# Patient Record
Sex: FEMALE | Race: WHITE | NOT HISPANIC OR LATINO | Employment: UNEMPLOYED | URBAN - METROPOLITAN AREA
[De-identification: names, ages, dates, MRNs, and addresses within clinical notes are randomized per-mention and may not be internally consistent; named-entity substitution may affect disease eponyms.]

---

## 2017-06-08 RX ORDER — LISINOPRIL 10 MG/1
TABLET ORAL
Qty: 30 TABLET | Refills: 5 | Status: SHIPPED | OUTPATIENT
Start: 2017-06-08 | End: 2018-01-10 | Stop reason: SDUPTHER

## 2018-01-10 RX ORDER — LISINOPRIL 10 MG/1
TABLET ORAL
Qty: 30 TABLET | Refills: 5 | Status: SHIPPED | OUTPATIENT
Start: 2018-01-10 | End: 2019-07-23 | Stop reason: SDUPTHER

## 2018-04-02 ENCOUNTER — OFFICE VISIT (OUTPATIENT)
Dept: FAMILY MEDICINE | Facility: CLINIC | Age: 48
End: 2018-04-02
Payer: COMMERCIAL

## 2018-04-02 VITALS
BODY MASS INDEX: 25.83 KG/M2 | OXYGEN SATURATION: 98 % | HEART RATE: 80 BPM | HEIGHT: 65 IN | DIASTOLIC BLOOD PRESSURE: 70 MMHG | SYSTOLIC BLOOD PRESSURE: 110 MMHG | TEMPERATURE: 98 F | WEIGHT: 155 LBS

## 2018-04-02 DIAGNOSIS — J06.9 ACUTE URI: Primary | ICD-10-CM

## 2018-04-02 PROBLEM — Z78.9 NON-SMOKER: Status: ACTIVE | Noted: 2018-04-02

## 2018-04-02 PROBLEM — E78.00 ELEVATED LOW-DENSITY LIPOPROTEIN LEVEL: Status: ACTIVE | Noted: 2018-04-02

## 2018-04-02 PROBLEM — I10 ESSENTIAL HYPERTENSION: Status: ACTIVE | Noted: 2018-04-02

## 2018-04-02 PROCEDURE — 99213 OFFICE O/P EST LOW 20 MIN: CPT | Mod: ,,, | Performed by: NURSE PRACTITIONER

## 2018-04-02 RX ORDER — PROGESTERONE 200 MG/1
200 CAPSULE ORAL NIGHTLY
Refills: 1 | COMMUNITY
Start: 2018-02-08

## 2018-04-02 RX ORDER — AZITHROMYCIN 250 MG/1
TABLET, FILM COATED ORAL
Qty: 6 TABLET | Refills: 0 | Status: SHIPPED | OUTPATIENT
Start: 2018-04-02 | End: 2018-04-07

## 2018-04-02 RX ORDER — ESTRADIOL 1 MG/1
1 TABLET ORAL DAILY
Refills: 1 | COMMUNITY
Start: 2018-03-13 | End: 2019-07-23

## 2018-04-02 NOTE — PATIENT INSTRUCTIONS

## 2018-04-02 NOTE — PROGRESS NOTES
SUBJECTIVE:      Patient ID: Cely Russell is a 47 y.o. female.    Chief Complaint: Sinusitis (sore throat,no feverx 3 days)    Cely is here today for eval of sore throat and sinus congestion that has been present 3-4 days.      Sinusitis   This is a new problem. The current episode started in the past 7 days. The problem has been waxing and waning since onset. There has been no fever. The pain is mild. Associated symptoms include congestion, coughing, ear pain, headaches, a hoarse voice, sinus pressure, sneezing and a sore throat. Pertinent negatives include no chills, diaphoresis, neck pain, shortness of breath or swollen glands. Past treatments include oral decongestants. The treatment provided significant relief.       History reviewed. No pertinent surgical history.  History reviewed. No pertinent family history.   Social History     Social History    Marital status: Single     Spouse name: N/A    Number of children: N/A    Years of education: N/A     Occupational History    house wife      Social History Main Topics    Smoking status: Never Smoker    Smokeless tobacco: Never Used    Alcohol use Yes    Drug use: No    Sexual activity: Yes     Partners: Male     Other Topics Concern    None     Social History Narrative    None     Current Outpatient Prescriptions   Medication Sig Dispense Refill    estradiol (ESTRACE) 1 MG tablet Take 1 tablet by mouth once daily.  1    progesterone (PROMETRIUM) 200 MG capsule Take 200 mg by mouth every evening.  1    azithromycin (Z-LYLY) 250 MG tablet Take 2 tablets by mouth on day 1; Take 1 tablet by mouth on days 2-5 6 tablet 0    lisinopril 10 MG tablet take 1 tablet by mouth once daily 30 tablet 5     No current facility-administered medications for this visit.      Review of patient's allergies indicates:  No Known Allergies   History reviewed. No pertinent past medical history.  History reviewed. No pertinent surgical history.    Review of Systems  "  Constitutional: Negative for activity change, appetite change, chills, diaphoresis and fatigue.   HENT: Positive for congestion, ear pain, hoarse voice, postnasal drip, rhinorrhea, sinus pressure, sneezing and sore throat.    Eyes: Negative for pain and visual disturbance.   Respiratory: Positive for cough. Negative for shortness of breath.    Cardiovascular: Negative for chest pain and palpitations.   Gastrointestinal: Negative for abdominal pain, constipation and diarrhea.   Endocrine: Negative for polydipsia, polyphagia and polyuria.   Genitourinary: Negative for dysuria, frequency and urgency.   Musculoskeletal: Negative for arthralgias, gait problem, myalgias and neck pain.   Skin: Negative for color change, pallor and rash.   Allergic/Immunologic: Negative for immunocompromised state.   Neurological: Positive for headaches. Negative for dizziness, syncope and numbness.   Hematological: Negative for adenopathy.   Psychiatric/Behavioral: Negative for confusion, self-injury and suicidal ideas.      OBJECTIVE:      Vitals:    04/02/18 1137   BP: 110/70   Pulse: 80   Temp: 98.1 °F (36.7 °C)   SpO2: 98%   Weight: 70.3 kg (155 lb)   Height: 5' 5" (1.651 m)     Physical Exam   Constitutional: She is oriented to person, place, and time. She appears well-developed and well-nourished. No distress.   HENT:   Head: Normocephalic and atraumatic.   Right Ear: External ear and ear canal normal. Tympanic membrane is not erythematous. A middle ear effusion is present.   Left Ear: External ear and ear canal normal. Tympanic membrane is not erythematous. A middle ear effusion is present.   Nose: Mucosal edema and rhinorrhea present. Right sinus exhibits no maxillary sinus tenderness and no frontal sinus tenderness. Left sinus exhibits no maxillary sinus tenderness and no frontal sinus tenderness.   Mouth/Throat: Uvula is midline and oropharynx is clear and moist. No oropharyngeal exudate, posterior oropharyngeal edema or " posterior oropharyngeal erythema.   Eyes: Conjunctivae, EOM and lids are normal. Pupils are equal, round, and reactive to light. Right eye exhibits no discharge. Left eye exhibits no discharge. No scleral icterus.   Neck: Normal range of motion. Neck supple. Carotid bruit is not present. No thyromegaly present.   Cardiovascular: Normal rate, regular rhythm, normal heart sounds and intact distal pulses.  Exam reveals no gallop and no friction rub.    No murmur heard.  Pulmonary/Chest: Effort normal and breath sounds normal. No respiratory distress. She has no wheezes. She has no rales.   Abdominal: Soft. Bowel sounds are normal. There is no tenderness.   Musculoskeletal: Normal range of motion.   Lymphadenopathy:     She has no cervical adenopathy.   Neurological: She is alert and oriented to person, place, and time.   Skin: Skin is warm, dry and intact. She is not diaphoretic.   Psychiatric: She has a normal mood and affect. Her behavior is normal. Judgment and thought content normal. She expresses no suicidal plans.      Assessment:       1. Acute URI        Plan:       Acute URI   Symptoms are most likely due to viral infection.  Advised to take OTC medications such as tylenol and motrin for fever, Mucinex DM or similar for cough, antihistamine and/or decongestant for nasal symptoms.  Monitor blood pressure if a decongestant is used.  Get plenty of rest and fluids to maintain hydration. Gargle with warm salt water if sore throat is present.  An antibiotic Rx was provided in case symptoms worsen or due not resolve within 10-14 days.   -     azithromycin (Z-LYLY) 250 MG tablet; Take 2 tablets by mouth on day 1; Take 1 tablet by mouth on days 2-5  Dispense: 6 tablet; Refill: 0      Follow-up if symptoms worsen or fail to improve.      4/2/2018 Rhoda Lee, CORINE, FNP

## 2019-01-11 ENCOUNTER — CLINICAL SUPPORT (OUTPATIENT)
Dept: URGENT CARE | Facility: CLINIC | Age: 49
End: 2019-01-11
Payer: COMMERCIAL

## 2019-01-11 VITALS
RESPIRATION RATE: 16 BRPM | HEIGHT: 65 IN | OXYGEN SATURATION: 98 % | SYSTOLIC BLOOD PRESSURE: 161 MMHG | WEIGHT: 155 LBS | DIASTOLIC BLOOD PRESSURE: 93 MMHG | HEART RATE: 71 BPM | BODY MASS INDEX: 25.83 KG/M2 | TEMPERATURE: 98 F

## 2019-01-11 DIAGNOSIS — J02.9 SORE THROAT: Primary | ICD-10-CM

## 2019-01-11 DIAGNOSIS — J02.9 VIRAL PHARYNGITIS: ICD-10-CM

## 2019-01-11 LAB
CTP QC/QA: YES
S PYO RRNA THROAT QL PROBE: NEGATIVE

## 2019-01-11 PROCEDURE — 87880 POCT RAPID STREP A: ICD-10-PCS | Mod: QW,S$GLB,, | Performed by: NURSE PRACTITIONER

## 2019-01-11 PROCEDURE — 87880 STREP A ASSAY W/OPTIC: CPT | Mod: QW,S$GLB,, | Performed by: NURSE PRACTITIONER

## 2019-01-11 PROCEDURE — 99204 OFFICE O/P NEW MOD 45 MIN: CPT | Mod: 25,S$GLB,, | Performed by: NURSE PRACTITIONER

## 2019-01-11 PROCEDURE — 99204 PR OFFICE/OUTPT VISIT, NEW, LEVL IV, 45-59 MIN: ICD-10-PCS | Mod: 25,S$GLB,, | Performed by: NURSE PRACTITIONER

## 2019-01-11 NOTE — PATIENT INSTRUCTIONS
Self-Care for Sore Throats    Sore throats happen for many reasons, such as colds, allergies, and infections caused by viruses or bacteria. In any case, your throat becomes red and sore. Your goal for self-care is to reduce your discomfort while giving your throat a chance to heal.  Moisten and soothe your throat  Tips include the following:  · Try a sip of water first thing after waking up.  · Keep your throat moist by drinking 6 or more glasses of clear liquids every day.  · Run a cool-air humidifier in your room overnight.  · Avoid cigarette smoke.   · Suck on throat lozenges, cough drops, hard candy, ice chips, or frozen fruit-juice bars. Use the sugar-free versions if your diet or medical condition requires them.  Gargle to ease irritation  Gargling every hour or 2 can ease irritation. Try gargling with 1 of these solutions:  · 1/4 teaspoon of salt in 1/2 cup of warm water  · An over-the-counter anesthetic gargle  Use medicine for more relief  Over-the-counter medicine can reduce sore throat symptoms. Ask your pharmacist if you have questions about which medicine to use:  · Ease pain with anesthetic sprays. Aspirin or an aspirin substitute also helps. Remember, never give aspirin to anyone 18 or younger, or if you are already taking blood thinners.   · For sore throats caused by allergies, try antihistamines to block the allergic reaction.  · Remember: unless a sore throat is caused by a bacterial infection, antibiotics wont help you.  Prevent future sore throats  Prevention tips include the following:  · Stop smoking or reduce contact with secondhand smoke. Smoke irritates the tender throat lining.  · Limit contact with pets and with allergy-causing substances, such as pollen and mold.  · When youre around someone with a sore throat or cold, wash your hands often to keep viruses or bacteria from spreading.  · Dont strain your vocal cords.  Call your healthcare provider  Contact your healthcare provider if  you have:  · A temperature over 101°F (38.3°C)  · White spots on the throat  · Great difficulty swallowing  · Trouble breathing  · A skin rash  · Recent exposure to someone else with strep bacteria  · Severe hoarseness and swollen glands in the neck or jaw   Date Last Reviewed: 8/1/2016  © 2219-0652 [employer]. [address] [place] [address]. All rights reserved. This information is not intended as a substitute for professional medical care. Always follow your healthcare professional's instructions.        Viral Pharyngitis (Sore Throat)    You (or your child, if your child is the patient) have pharyngitis (sore throat). This infection is caused by a virus. It can cause throat pain that is worse when swallowing, aching all over, headache, and fever. The infection may be spread by coughing, kissing, or touching others after touching your mouth or nose. Antibiotic medications do not work against viruses, so they are not used for treating this condition.  Home care  · If your symptoms are severe, rest at home. Return to work or school when you feel well enough.   · Drink plenty of fluids to avoid dehydration.  · For children: Use acetaminophen for fever, fussiness or discomfort. In infants over six months of age, you may use ibuprofen instead of acetaminophen. (NOTE: If your child has chronic liver or kidney disease or ever had a stomach ulcer or GI bleeding, talk with your doctor before using these medicines.) (NOTE: Aspirin should never be used in anyone under 18 years of age who is ill with a fever. It may cause severe liver damage.)   · For adults: You may use acetaminophen or ibuprofen to control pain or fever, unless another medicine was prescribed for this. (NOTE: If you have chronic liver or kidney disease or ever had a stomach ulcer or GI bleeding, talk with your doctor before using these medicines.)  · Throat lozenges or numbing throat sprays can help reduce pain. Gargling with warm  salt water will also help reduce throat pain. For this, dissolve 1/2 teaspoon of salt in 1 glass of warm water. To help soothe a sore throat, children can sip on juice or a popsicle. Children 5 years and older can also suck on a lollipop or hard candy.  · Avoid salty or spicy foods, which can be irritating to the throat.  Follow-up care  Follow up with your healthcare provider or our staff if you are not improving over the next week.  When to seek medical advice  Call your healthcare provider right away if any of these occur:  · Fever as directed by your doctor.  For children, seek care if:  ¨ Your child is of any age and has repeated fevers above 104°F (40°C).  ¨ Your child is younger than 2 years of age and has a fever of 100.4°F (38°C) that continues for more than 1 day.  ¨ Your child is 2 years old or older and has a fever of 100.4°F (38°C) that continues for more than 3 days.  · New or worsening ear pain, sinus pain, or headache  · Painful lumps in the back of neck  · Stiff neck  · Lymph nodes are getting larger  · Inability to swallow liquids, excessive drooling, or inability to open mouth wide due to throat pain  · Signs of dehydration (very dark urine or no urine, sunken eyes, dizziness)  · Trouble breathing or noisy breathing  · Muffled voice  · New rash  · Child appears to be getting sicker  Date Last Reviewed: 4/13/2015  © 1720-2209 [employer]. [address] [place] [address], [place] [address]. All rights reserved. This information is not intended as a substitute for professional medical care. Always follow your healthcare professional's instructions.

## 2019-01-11 NOTE — PROGRESS NOTES
"Subjective:       Patient ID: Cely Russell is a 48 y.o. female.    Vitals:  height is 5' 5" (1.651 m) and weight is 70.3 kg (155 lb). Her oral temperature is 98 °F (36.7 °C). Her blood pressure is 161/93 (abnormal) and her pulse is 71. Her respiration is 16 and oxygen saturation is 98%.     Chief Complaint: Sore Throat    Cely Russell is a 48 year old female presenting to the clinic with c/o a one day history of sore throat. She states that she believes she had fever yesterday but did not take her temperature. She denies any other upper respiratory symptoms. She has taken no medications for her symptoms. No known sick contacts.       Sore Throat    This is a new problem. The current episode started yesterday. Pertinent negatives include no ear pain or trouble swallowing. Treatments tried: hallie-seltzer cold.       Constitution: Positive for chills and fever. Negative for appetite change.   HENT: Positive for sore throat. Negative for ear pain, sinus pain, sinus pressure and trouble swallowing.    Neck: negative.   Cardiovascular: Negative.    Eyes: Negative for eye discharge and eye redness.   Respiratory: Negative.    Gastrointestinal: Negative.    Musculoskeletal: Negative.    Skin: Negative.        Objective:      Physical Exam   Constitutional: She is oriented to person, place, and time. She appears well-developed and well-nourished. She is cooperative.  Non-toxic appearance. She does not appear ill. No distress.   HENT:   Head: Normocephalic and atraumatic.   Right Ear: Hearing, tympanic membrane, external ear and ear canal normal.   Left Ear: Hearing, tympanic membrane, external ear and ear canal normal.   Nose: Nose normal. No mucosal edema, rhinorrhea or nasal deformity. No epistaxis. Right sinus exhibits no maxillary sinus tenderness and no frontal sinus tenderness. Left sinus exhibits no maxillary sinus tenderness and no frontal sinus tenderness.   Mouth/Throat: Uvula is midline and mucous membranes are " normal. No trismus in the jaw. Normal dentition. No uvula swelling. Posterior oropharyngeal erythema present. No oropharyngeal exudate or posterior oropharyngeal edema.   Eyes: Conjunctivae and lids are normal. Right eye exhibits no discharge. Left eye exhibits no discharge. No scleral icterus.   Sclera clear bilat   Neck: Trachea normal, normal range of motion, full passive range of motion without pain and phonation normal. Neck supple.   Cardiovascular: Normal rate, regular rhythm, normal heart sounds, intact distal pulses and normal pulses.   Pulmonary/Chest: Effort normal and breath sounds normal. No respiratory distress.   Abdominal: Soft. Normal appearance and bowel sounds are normal. She exhibits no distension, no pulsatile midline mass and no mass. There is no tenderness.   Musculoskeletal: Normal range of motion. She exhibits no edema or deformity.   Lymphadenopathy:     She has cervical adenopathy.   Neurological: She is alert and oriented to person, place, and time. She exhibits normal muscle tone. Coordination normal.   Skin: Skin is warm, dry and intact. She is not diaphoretic. No pallor.   Psychiatric: She has a normal mood and affect. Her speech is normal and behavior is normal. Judgment and thought content normal. Cognition and memory are normal.   Nursing note and vitals reviewed.      Assessment:       1. Sore throat    2. Viral pharyngitis        Plan:       The patient appears to have a viral upper respiratory infection with a viral syndrome.  Based upon the history and physical exam the patient does not appear to have a serious bacterial infection such as pneumonia, sepsis, otitis media, bacterial sinusitis, strep pharyngitis, parapharyngeal or peritonsillar abscess, meningitis.  I do not think the patient needs antibiotics as their illness likely has a viral etiology.  Patient appears very well and I have given specific return precautions to the patient and/or family members.  I have instructed  the patient to hydrate, take over the counter medications and follow up with their regular doctor or the one provided. Based on my clinical evaluation, I do not appreciate any immediate, emergent, or life threatening condition or etiology that warrants additional workup today and feel that the patient can be discharged with close follow up care.         Sore throat  -     POCT rapid strep A    Viral pharyngitis

## 2019-07-23 ENCOUNTER — OFFICE VISIT (OUTPATIENT)
Dept: FAMILY MEDICINE | Facility: CLINIC | Age: 49
End: 2019-07-23
Payer: COMMERCIAL

## 2019-07-23 VITALS
SYSTOLIC BLOOD PRESSURE: 160 MMHG | OXYGEN SATURATION: 98 % | WEIGHT: 161 LBS | DIASTOLIC BLOOD PRESSURE: 94 MMHG | HEART RATE: 91 BPM | TEMPERATURE: 99 F | BODY MASS INDEX: 26.82 KG/M2 | HEIGHT: 65 IN

## 2019-07-23 DIAGNOSIS — I10 ESSENTIAL HYPERTENSION: Primary | ICD-10-CM

## 2019-07-23 DIAGNOSIS — N95.1 PERIMENOPAUSE: ICD-10-CM

## 2019-07-23 PROCEDURE — 99213 OFFICE O/P EST LOW 20 MIN: CPT | Mod: ,,, | Performed by: INTERNAL MEDICINE

## 2019-07-23 PROCEDURE — 99213 PR OFFICE/OUTPT VISIT, EST, LEVL III, 20-29 MIN: ICD-10-PCS | Mod: ,,, | Performed by: INTERNAL MEDICINE

## 2019-07-23 RX ORDER — LISINOPRIL 10 MG/1
10 TABLET ORAL DAILY
Qty: 90 TABLET | Refills: 1 | Status: SHIPPED | OUTPATIENT
Start: 2019-07-23 | End: 2020-01-30

## 2019-07-23 NOTE — PROGRESS NOTES
Subjective:       Patient ID: Cely Russell is a 49 y.o. female.    Chief Complaint: Hypertension (continuity of care and med refill)    Hypertension   This is a chronic problem. The problem is uncontrolled. Pertinent negatives include no chest pain, headaches, neck pain, palpitations, peripheral edema or shortness of breath. Past treatments include ACE inhibitors. Compliance problems: lack of follow up;  has been out of meds for months.      Review of Systems   Constitutional: Positive for unexpected weight change (weight gain). Negative for chills, fatigue and fever.   HENT: Negative for congestion, hearing loss, postnasal drip, rhinorrhea, trouble swallowing and voice change.    Eyes: Negative for photophobia and visual disturbance.   Respiratory: Negative for apnea, cough, choking, chest tightness, shortness of breath and wheezing.    Cardiovascular: Negative for chest pain, palpitations and leg swelling.   Gastrointestinal: Negative for abdominal pain, blood in stool, constipation, diarrhea, nausea, rectal pain and vomiting.   Endocrine: Negative for cold intolerance, heat intolerance, polydipsia and polyuria.   Genitourinary: Negative for decreased urine volume, difficulty urinating, dysuria, frequency, genital sores, hematuria, menstrual problem, pelvic pain, urgency, vaginal bleeding and vaginal discharge.   Musculoskeletal: Negative for arthralgias, back pain, gait problem, joint swelling, myalgias, neck pain and neck stiffness.   Skin: Negative for color change, rash and wound.   Allergic/Immunologic: Negative for environmental allergies and food allergies.   Neurological: Negative for dizziness, tremors, seizures, syncope, facial asymmetry, speech difficulty, weakness, light-headedness, numbness and headaches.   Hematological: Negative for adenopathy. Does not bruise/bleed easily.   Psychiatric/Behavioral: Negative for confusion, hallucinations, sleep disturbance and suicidal ideas. The patient  is not nervous/anxious.        Past Medical History:   Diagnosis Date    Hypertension       Past Surgical History:   Procedure Laterality Date    BREAST SURGERY      augmentation    TUBAL LIGATION         Family History   Problem Relation Age of Onset    Diabetes Mother     Coronary artery disease Father         early 60s    Hypertension Father     Breast cancer Neg Hx     Colon cancer Neg Hx        Social History     Socioeconomic History    Marital status: Single     Spouse name: Not on file    Number of children: Not on file    Years of education: Not on file    Highest education level: Not on file   Occupational History    Occupation: house wife   Social Needs    Financial resource strain: Not on file    Food insecurity:     Worry: Not on file     Inability: Not on file    Transportation needs:     Medical: Not on file     Non-medical: Not on file   Tobacco Use    Smoking status: Never Smoker    Smokeless tobacco: Never Used   Substance and Sexual Activity    Alcohol use: Yes     Frequency: Monthly or less     Drinks per session: 3 or 4    Drug use: No    Sexual activity: Yes     Partners: Male     Birth control/protection: See Surgical Hx   Lifestyle    Physical activity:     Days per week: Not on file     Minutes per session: Not on file    Stress: Not at all   Relationships    Social connections:     Talks on phone: Not on file     Gets together: Not on file     Attends Muslim service: Not on file     Active member of club or organization: Not on file     Attends meetings of clubs or organizations: Not on file     Relationship status: Not on file   Other Topics Concern    Not on file   Social History Narrative    Live with        Current Outpatient Medications   Medication Sig Dispense Refill    progesterone (PROMETRIUM) 200 MG capsule Take 200 mg by mouth every evening.  1    lisinopril 10 MG tablet Take 1 tablet (10 mg total) by mouth once daily. 90 tablet 1     No  "current facility-administered medications for this visit.        Review of patient's allergies indicates:  No Known Allergies  Objective:    HPI     Hypertension      Additional comments: continuity of care and med refill          Last edited by Alireza Mckeon MA on 7/23/2019  1:53 PM. (History)      Blood pressure (!) 160/94, pulse 91, temperature 99.1 °F (37.3 °C), temperature source Temporal, height 5' 5" (1.651 m), weight 73 kg (161 lb), last menstrual period 07/14/2019, SpO2 98 %. Body mass index is 26.79 kg/m².   Physical Exam   Constitutional: She appears well-developed. No distress.   HENT:   Nose: Nose normal.   Mouth/Throat: Oropharynx is clear and moist.   Eyes: Conjunctivae are normal. Right eye exhibits no discharge. Left eye exhibits no discharge. No scleral icterus.   Neck: Carotid bruit is not present.   Cardiovascular: Normal rate, regular rhythm and normal heart sounds.   No murmur heard.  Pulmonary/Chest: Effort normal and breath sounds normal. No respiratory distress. She has no decreased breath sounds. She has no wheezes. She has no rhonchi. She has no rales.   Abdominal: Soft. She exhibits no distension. There is no tenderness. There is no rebound and no guarding.   Musculoskeletal: She exhibits no edema.   Neurological: She is alert. She displays no tremor.   Skin: Skin is warm and dry.   Psychiatric: She has a normal mood and affect. Her speech is normal.   Nursing note and vitals reviewed.          Assessment:       1. Essential hypertension    2. Perimenopause        Plan:       Cely was seen today for hypertension.    Diagnoses and all orders for this visit:    Essential hypertension  -     lisinopril 10 MG tablet; Take 1 tablet (10 mg total) by mouth once daily.    Perimenopause  Comments:  She recently started Bio T with her GYN.  Encouraged to monitor labs and not have testosterone be supra physiologic         "

## 2020-01-29 DIAGNOSIS — I10 ESSENTIAL HYPERTENSION: ICD-10-CM

## 2020-01-30 RX ORDER — LISINOPRIL 10 MG/1
TABLET ORAL
Qty: 90 TABLET | Refills: 0 | Status: SHIPPED | OUTPATIENT
Start: 2020-01-30 | End: 2020-03-30 | Stop reason: SDUPTHER

## 2020-03-30 ENCOUNTER — OFFICE VISIT (OUTPATIENT)
Dept: FAMILY MEDICINE | Facility: CLINIC | Age: 50
End: 2020-03-30
Payer: COMMERCIAL

## 2020-03-30 VITALS
BODY MASS INDEX: 24.16 KG/M2 | HEIGHT: 65 IN | DIASTOLIC BLOOD PRESSURE: 66 MMHG | SYSTOLIC BLOOD PRESSURE: 110 MMHG | HEART RATE: 84 BPM | TEMPERATURE: 99 F | WEIGHT: 145 LBS | OXYGEN SATURATION: 98 %

## 2020-03-30 DIAGNOSIS — I10 ESSENTIAL HYPERTENSION: ICD-10-CM

## 2020-03-30 PROCEDURE — 99213 PR OFFICE/OUTPT VISIT, EST, LEVL III, 20-29 MIN: ICD-10-PCS | Mod: S$GLB,,, | Performed by: INTERNAL MEDICINE

## 2020-03-30 PROCEDURE — 99213 OFFICE O/P EST LOW 20 MIN: CPT | Mod: S$GLB,,, | Performed by: INTERNAL MEDICINE

## 2020-03-30 RX ORDER — LISINOPRIL 10 MG/1
TABLET ORAL
Qty: 90 TABLET | Refills: 1 | Status: SHIPPED | OUTPATIENT
Start: 2020-03-30

## 2020-03-30 NOTE — PROGRESS NOTES
Subjective:       Patient ID: Cely Russell is a 49 y.o. female.    Chief Complaint: Hypertension (continuity of care)    Here for routine f/u and med refills.  Has had labs within the last 3 months with GYN to monitor Bio T pellets.      Hypertension   This is a chronic problem. The problem is controlled. Associated symptoms include anxiety. Pertinent negatives include no chest pain, headaches, neck pain, palpitations, peripheral edema or shortness of breath. Risk factors for coronary artery disease include post-menopausal state. Past treatments include ACE inhibitors. There are no compliance problems.      Review of Systems   Constitutional: Negative for chills, fatigue, fever and unexpected weight change.   HENT: Negative for congestion, hearing loss, postnasal drip, rhinorrhea, trouble swallowing and voice change.    Eyes: Negative for photophobia and visual disturbance.   Respiratory: Negative for apnea, cough, choking, chest tightness, shortness of breath and wheezing.    Cardiovascular: Negative for chest pain, palpitations and leg swelling.   Gastrointestinal: Negative for abdominal pain, blood in stool, constipation, diarrhea, nausea, rectal pain and vomiting.   Endocrine: Negative for cold intolerance, heat intolerance, polydipsia and polyuria.   Genitourinary: Negative for decreased urine volume, difficulty urinating, dysuria, frequency, genital sores, hematuria, menstrual problem, pelvic pain, urgency, vaginal bleeding and vaginal discharge.   Musculoskeletal: Negative for arthralgias, back pain, gait problem, joint swelling, myalgias, neck pain and neck stiffness.   Skin: Negative for color change, rash and wound.   Allergic/Immunologic: Negative for environmental allergies and food allergies.   Neurological: Negative for dizziness, tremors, seizures, syncope, facial asymmetry, speech difficulty, weakness, light-headedness, numbness and headaches.   Hematological: Negative for adenopathy. Does  not bruise/bleed easily.   Psychiatric/Behavioral: Negative for confusion, hallucinations, sleep disturbance and suicidal ideas. The patient is not nervous/anxious.        Past Medical History:   Diagnosis Date    Hypertension       Past Surgical History:   Procedure Laterality Date    BREAST SURGERY      augmentation    TUBAL LIGATION         Family History   Problem Relation Age of Onset    Diabetes Mother     Coronary artery disease Father         early 60s    Hypertension Father     Breast cancer Neg Hx     Colon cancer Neg Hx        Social History     Socioeconomic History    Marital status:      Spouse name: Not on file    Number of children: Not on file    Years of education: Not on file    Highest education level: Not on file   Occupational History    Occupation: house wife   Social Needs    Financial resource strain: Not on file    Food insecurity:     Worry: Not on file     Inability: Not on file    Transportation needs:     Medical: Not on file     Non-medical: Not on file   Tobacco Use    Smoking status: Never Smoker    Smokeless tobacco: Never Used   Substance and Sexual Activity    Alcohol use: Yes     Frequency: Monthly or less     Drinks per session: 3 or 4    Drug use: No    Sexual activity: Yes     Partners: Male     Birth control/protection: See Surgical Hx   Lifestyle    Physical activity:     Days per week: Not on file     Minutes per session: Not on file    Stress: Not at all   Relationships    Social connections:     Talks on phone: Not on file     Gets together: Not on file     Attends Judaism service: Not on file     Active member of club or organization: Not on file     Attends meetings of clubs or organizations: Not on file     Relationship status: Not on file   Other Topics Concern    Not on file   Social History Narrative    Live with        Current Outpatient Medications   Medication Sig Dispense Refill    lisinopriL 10 MG tablet TAKE 1  "TABLET(10 MG) BY MOUTH EVERY DAY 90 tablet 1    progesterone (PROMETRIUM) 200 MG capsule Take 200 mg by mouth every evening.  1     No current facility-administered medications for this visit.        Review of patient's allergies indicates:  No Known Allergies  Objective:    HPI     Hypertension      Additional comments: continuity of care          Last edited by Alireza Mckeon MA on 3/30/2020 10:40 AM. (History)      Blood pressure 110/66, pulse 84, temperature 98.7 °F (37.1 °C), temperature source Oral, height 5' 5" (1.651 m), weight 65.8 kg (145 lb), last menstrual period 02/17/2020, SpO2 98 %. Body mass index is 24.13 kg/m².   Physical Exam   Constitutional: She appears well-developed. No distress.   HENT:   Nose: Nose normal.   Mouth/Throat: Oropharynx is clear and moist.   Eyes: Conjunctivae are normal. Right eye exhibits no discharge. Left eye exhibits no discharge. No scleral icterus.   Neck: Carotid bruit is not present.   Cardiovascular: Normal rate, regular rhythm and normal heart sounds.   No murmur heard.  Pulmonary/Chest: Effort normal and breath sounds normal. No respiratory distress. She has no decreased breath sounds. She has no wheezes. She has no rhonchi. She has no rales.   Abdominal: Soft. She exhibits no distension. There is no tenderness. There is no rebound and no guarding.   Musculoskeletal: She exhibits no edema.   Neurological: She is alert. She displays no tremor.   Skin: Skin is warm and dry.   Psychiatric: She has a normal mood and affect. Her speech is normal.   Nursing note and vitals reviewed.          Assessment:       1. Essential hypertension        Plan:       Cely was seen today for hypertension.    Diagnoses and all orders for this visit:    Essential hypertension  Comments:  Low salt diet    Orders:  -     lisinopriL 10 MG tablet; TAKE 1 TABLET(10 MG) BY MOUTH EVERY DAY         "

## 2020-04-28 ENCOUNTER — CLINICAL SUPPORT (OUTPATIENT)
Dept: URGENT CARE | Facility: CLINIC | Age: 50
End: 2020-04-28
Payer: COMMERCIAL

## 2020-04-28 VITALS
DIASTOLIC BLOOD PRESSURE: 77 MMHG | BODY MASS INDEX: 24.39 KG/M2 | SYSTOLIC BLOOD PRESSURE: 130 MMHG | TEMPERATURE: 97 F | HEART RATE: 69 BPM | HEIGHT: 65 IN | OXYGEN SATURATION: 98 % | WEIGHT: 146.38 LBS

## 2020-04-28 DIAGNOSIS — R42 VERTIGO: Primary | ICD-10-CM

## 2020-04-28 DIAGNOSIS — R11.2 NAUSEA AND VOMITING, INTRACTABILITY OF VOMITING NOT SPECIFIED, UNSPECIFIED VOMITING TYPE: ICD-10-CM

## 2020-04-28 LAB
B-HCG UR QL: NEGATIVE
BILIRUB UR QL STRIP: NEGATIVE
CTP QC/QA: YES
GLUCOSE UR QL STRIP: NEGATIVE
KETONES UR QL STRIP: POSITIVE
LEUKOCYTE ESTERASE UR QL STRIP: NEGATIVE
PH, POC UA: 5
POC BLOOD, URINE: POSITIVE
POC NITRATES, URINE: NEGATIVE
PROT UR QL STRIP: POSITIVE
SP GR UR STRIP: 1.02 (ref 1–1.03)
UROBILINOGEN UR STRIP-ACNC: NORMAL (ref 0.1–1.1)

## 2020-04-28 PROCEDURE — 99214 OFFICE O/P EST MOD 30 MIN: CPT | Mod: 25,S$GLB,, | Performed by: NURSE PRACTITIONER

## 2020-04-28 PROCEDURE — 81003 POCT URINALYSIS, DIPSTICK, AUTOMATED, W/O SCOPE: ICD-10-PCS | Mod: QW,S$GLB,, | Performed by: NURSE PRACTITIONER

## 2020-04-28 PROCEDURE — 81003 URINALYSIS AUTO W/O SCOPE: CPT | Mod: QW,S$GLB,, | Performed by: NURSE PRACTITIONER

## 2020-04-28 PROCEDURE — 81025 URINE PREGNANCY TEST: CPT | Mod: S$GLB,,, | Performed by: NURSE PRACTITIONER

## 2020-04-28 PROCEDURE — 81025 POCT URINE PREGNANCY: ICD-10-PCS | Mod: S$GLB,,, | Performed by: NURSE PRACTITIONER

## 2020-04-28 PROCEDURE — 99214 PR OFFICE/OUTPT VISIT, EST, LEVL IV, 30-39 MIN: ICD-10-PCS | Mod: 25,S$GLB,, | Performed by: NURSE PRACTITIONER

## 2020-04-28 RX ORDER — ONDANSETRON 4 MG/1
4 TABLET, ORALLY DISINTEGRATING ORAL
Status: COMPLETED | OUTPATIENT
Start: 2020-04-28 | End: 2020-04-28

## 2020-04-28 RX ORDER — CETIRIZINE HYDROCHLORIDE 10 MG/1
10 TABLET ORAL DAILY
Qty: 30 TABLET | Refills: 0 | Status: SHIPPED | OUTPATIENT
Start: 2020-04-28 | End: 2020-05-28

## 2020-04-28 RX ORDER — FLUTICASONE PROPIONATE 50 MCG
2 SPRAY, SUSPENSION (ML) NASAL DAILY
Qty: 15.8 ML | Refills: 0 | Status: SHIPPED | OUTPATIENT
Start: 2020-04-28

## 2020-04-28 RX ORDER — ONDANSETRON 4 MG/1
4 TABLET, ORALLY DISINTEGRATING ORAL EVERY 8 HOURS PRN
Qty: 9 TABLET | Refills: 0 | Status: SHIPPED | OUTPATIENT
Start: 2020-04-28 | End: 2020-05-01

## 2020-04-28 RX ORDER — MECLIZINE HYDROCHLORIDE 25 MG/1
25 TABLET ORAL 3 TIMES DAILY PRN
Qty: 30 TABLET | Refills: 0 | Status: SHIPPED | OUTPATIENT
Start: 2020-04-28 | End: 2020-05-08

## 2020-04-28 RX ADMIN — ONDANSETRON 4 MG: 4 TABLET, ORALLY DISINTEGRATING ORAL at 12:04

## 2020-04-28 NOTE — PROGRESS NOTES
"Subjective:       Patient ID: Cely Russell is a 50 y.o. female.    Vitals:  height is 5' 5" (1.651 m) and weight is 66.4 kg (146 lb 6.4 oz). Her oral temperature is 97.2 °F (36.2 °C). Her blood pressure is 130/77 and her pulse is 69. Her oxygen saturation is 98%.     Chief Complaint: Nausea    Patient complains of feeling "woozy" and having nausea and vomiting for a few days. Denies fever or abdominal pain, diarrhea, shortness of breath or chest pain.     Nausea   This is a new problem. The current episode started in the past 7 days. The problem occurs constantly. The problem has been gradually worsening. Associated symptoms include fatigue, nausea and vomiting. Pertinent negatives include no arthralgias, chest pain, chills, congestion, coughing, fever, headaches, joint swelling, myalgias, rash, sore throat, vertigo or weakness. Associated symptoms comments: Dizziness  .       Constitution: Positive for fatigue. Negative for chills and fever.   HENT: Negative for congestion and sore throat.    Neck: Negative for painful lymph nodes.   Cardiovascular: Negative for chest pain and leg swelling.   Eyes: Negative for double vision and blurred vision.   Respiratory: Negative for cough and shortness of breath.    Gastrointestinal: Positive for nausea and vomiting. Negative for diarrhea.   Genitourinary: Negative for dysuria, frequency, urgency and history of kidney stones.   Musculoskeletal: Negative for joint pain, joint swelling, muscle cramps and muscle ache.   Skin: Negative for color change, pale, rash and bruising.   Allergic/Immunologic: Negative for seasonal allergies.   Neurological: Positive for dizziness. Negative for history of vertigo, light-headedness, passing out and headaches.   Hematologic/Lymphatic: Negative for swollen lymph nodes.   Psychiatric/Behavioral: Negative for nervous/anxious, sleep disturbance and depression. The patient is not nervous/anxious.        Objective:      Physical Exam "   Constitutional: She is oriented to person, place, and time. Vital signs are normal. She appears well-developed and well-nourished. She is cooperative.  Non-toxic appearance. She does not have a sickly appearance. She does not appear ill. No distress.   HENT:   Head: Normocephalic and atraumatic.   Right Ear: Hearing, external ear and ear canal normal. A middle ear effusion is present.   Left Ear: Hearing, external ear and ear canal normal. A middle ear effusion is present.   Nose: Nose normal. No mucosal edema, rhinorrhea or nasal deformity. No epistaxis. Right sinus exhibits no maxillary sinus tenderness and no frontal sinus tenderness. Left sinus exhibits no maxillary sinus tenderness and no frontal sinus tenderness.   Mouth/Throat: Uvula is midline, oropharynx is clear and moist and mucous membranes are normal. No trismus in the jaw. Normal dentition. No uvula swelling. No posterior oropharyngeal erythema.   Eyes: Conjunctivae and lids are normal. Right eye exhibits no discharge. Left eye exhibits no discharge. No scleral icterus.   Neck: Trachea normal, normal range of motion, full passive range of motion without pain and phonation normal. Neck supple.   Cardiovascular: Normal rate, regular rhythm, normal heart sounds, intact distal pulses and normal pulses.   Pulmonary/Chest: Effort normal and breath sounds normal. No respiratory distress.   Abdominal: Soft. Normal appearance and bowel sounds are normal. She exhibits no distension, no pulsatile midline mass and no mass. There is no tenderness.   Musculoskeletal: Normal range of motion. She exhibits no edema or deformity.   Neurological: She is alert and oriented to person, place, and time. She exhibits normal muscle tone. Coordination normal. GCS eye subscore is 4. GCS verbal subscore is 5. GCS motor subscore is 6.   +[place]   Skin: Skin is warm, dry, intact, not diaphoretic and not pale.   Psychiatric: She has a normal mood and affect. Her speech is  normal and behavior is normal. Judgment and thought content normal. Cognition and memory are normal.   Nursing note and vitals reviewed.        Assessment:       1. Vertigo    2. Nausea and vomiting, intractability of vomiting not specified, unspecified vomiting type        Plan:       Ua negative. UPT negative    After complete evaluation, including thorough history and physical exam, the patient's symptoms are most likely due to peripheral cause of vertigo (including, but not limited to BPPV, post-viral labyrinthitis, or Meniere's disease). There are no accompanying signs of focal weakness, sensory deficits, or cerebellar symptoms consistent with acute CVA, and no further neuroimaging is indicated or likely to be of benefit at this time. There is no associated headache to suggest SAH. There is no meningismus, fever, or evidence of infection to suggest meningitis/encephalitis. The patient was treated with supportive care zofran and improved. Peggy does not have Meclizine in clinic.    Vertigo    Nausea and vomiting, intractability of vomiting not specified, unspecified vomiting type  -     POCT Urinalysis, Dipstick, Automated, W/O Scope  -     POCT urine pregnancy    Other orders  -     ondansetron (ZOFRAN-ODT) 4 MG TbDL; Take 1 tablet (4 mg total) by mouth every 8 (eight) hours as needed.  Dispense: 9 tablet; Refill: 0  -     ondansetron disintegrating tablet 4 mg  -     cetirizine (ZYRTEC) 10 MG tablet; Take 1 tablet (10 mg total) by mouth once daily.  Dispense: 30 tablet; Refill: 0  -     fluticasone propionate (FLONASE) 50 mcg/actuation nasal spray; 2 sprays (100 mcg total) by Each Nostril route once daily.  Dispense: 15.8 mL; Refill: 0  -     meclizine (ANTIVERT) 25 mg tablet; Take 1 tablet (25 mg total) by mouth 3 (three) times daily as needed.  Dispense: 30 tablet; Refill: 0

## 2020-04-28 NOTE — PATIENT INSTRUCTIONS
Vertigo (Unknown Cause)    In addition to helping with hearing, the inner ear is part of the balance center of your body. Problems with the inner ear can a false feeling of motion. This is called vertigo. Often, it feels as if you or the room is spinning. A vertigo attack may cause sudden nausea, vomiting and heavy sweating. Severe vertigo causes a loss of balance and can cause you to fall. During vertigo, small head movements and changes in body position will often make the symptoms worse. You may also have ringing in the ears called tinnitus.  An episode of vertigo may last seconds, minutes or hours. Once you are over the first episode, it may never come back. However, symptoms may return off and on.  The cause of your vertigo is not yet known. Possible causes of vertigo include:  · Inflammation of the inner ear  · Disease of the nerves to the inner ear  · Movement of calcium particles in the inner ear  · Poor blood flow to the balance centers of the brain  · Migraine headaches  Home care  · If symptoms are severe, rest quietly in bed. Change positions very slowly. There is usually one position that will feel best, such as lying on one side or lying on your back with your head slightly raised on pillows.  · Do not drive a car or work with dangerous machinery until symptoms have been gone for at least one week.  · Take medicine as prescribed to relieve your symptoms. Unless another medicine was prescribed for symptoms of nausea, vomiting, and dizziness, you may use over-the-counter motion sickness pills. Ask your pharmacist for suggestions.  Follow-up care  Follow up with your healthcare provider or as directed. If you are referred to a specialist or for testing, make the appointment promptly.  When to seek medical advice  Call your healthcare provider if any of the following occur:  · Fever of 100.4°F (38ºC) or higher, or as directed by your healthcare provider  · Vertigo worsens or is not controlled  by prescribed medicine   · Repeated vomiting not relieved by prescribed medicine   · Severe headache  · Confusion  · Weakness of an arm or leg or one side of the face  · Difficulty with speech or vision  · Loss of consciousness   · Seizure  Date Last Reviewed: 8/16/2015 © 2000-2017 [employer]. [address] [place] [address]. All rights reserved. This information is not intended as a substitute for professional medical care. Always follow your healthcare professional's instructions.        Inner Ear Problems: Causes of Dizziness (Vertigo)       Benign positional vertigo (BPV)  This is the most common cause of vertigo. BPV is also called benign positional paroxysmal vertigo (BPPV). It happens when crystals in the ear canals shift into the wrong place. Vertigo usually occurs when you move your head in a certain way. This can happen when turning in bed, bending, or looking up. Because BPV comes on quickly, you should think about if you are safe to drive or do other tasks that need your full attention.  BPV:  · Causes vertigo that last for seconds. Vertigo can occur several times a day, depending on body position.  · Doesnt cause hearing loss  · Often goes away on its own. But it but may go away sooner with treatment.  Infection or inflammation  Sometimes the semicircular canals swell and send incorrect balance signals. This problem may be caused by a viral infection. Depending on the cause, your hearing can be affected (labyrinthitis). Or your hearing can remain normal (neuronitis).  Infection or inflammation:  · Causes vertigo that lasts for hours or days. The first episode is usually the worst.  · Can cause hearing loss  · Often goes away on its own. But it may go away sooner with treatment.  You may need vestibular rehabilitation if you have balance problems that don't go away.  Menieres disease  This condition is uncommon. It happens when there is too much fluid in the ear canals. This  causes increased pressure and swelling. It affects balance and hearing signals.  Menieres disease may:  · Cause vertigo that last for hours  · Cause hearing problems that come and go. The problems are usually in one ear and get worse over time.  · Cause buzzing or ringing in the ears (tinnitus)  · Cause a feeling of fullness or pressure in the ear  · Cause any of these symptoms: vertigo, hearing loss, tinnitus, or ear fullness to last a lifetime  Date Last Reviewed: 11/1/2016  © 9408-3788 [employer]. [address] [place] [address]. All rights reserved. This information is not intended as a substitute for professional medical care. Always follow your healthcare professional's instructions.

## 2021-04-01 ENCOUNTER — OFFICE VISIT (OUTPATIENT)
Dept: URGENT CARE | Facility: CLINIC | Age: 51
End: 2021-04-01
Payer: COMMERCIAL

## 2021-04-01 VITALS
BODY MASS INDEX: 26.33 KG/M2 | HEART RATE: 76 BPM | DIASTOLIC BLOOD PRESSURE: 77 MMHG | SYSTOLIC BLOOD PRESSURE: 133 MMHG | WEIGHT: 158 LBS | OXYGEN SATURATION: 98 % | TEMPERATURE: 98 F | HEIGHT: 65 IN

## 2021-04-01 DIAGNOSIS — J02.9 PHARYNGITIS, UNSPECIFIED ETIOLOGY: Primary | ICD-10-CM

## 2021-04-01 PROCEDURE — 99214 OFFICE O/P EST MOD 30 MIN: CPT | Mod: S$GLB,,, | Performed by: EMERGENCY MEDICINE

## 2021-04-01 PROCEDURE — 99214 PR OFFICE/OUTPT VISIT, EST, LEVL IV, 30-39 MIN: ICD-10-PCS | Mod: S$GLB,,, | Performed by: EMERGENCY MEDICINE

## 2021-04-01 RX ORDER — AMOXICILLIN 875 MG/1
875 TABLET, FILM COATED ORAL 2 TIMES DAILY
Qty: 20 TABLET | Refills: 0 | Status: SHIPPED | OUTPATIENT
Start: 2021-04-01 | End: 2021-04-11

## 2021-04-01 RX ORDER — BENZONATATE 100 MG/1
200 CAPSULE ORAL 3 TIMES DAILY PRN
Qty: 30 CAPSULE | Refills: 1 | Status: SHIPPED | OUTPATIENT
Start: 2021-04-01 | End: 2021-05-01

## 2021-10-08 ENCOUNTER — OFFICE VISIT (OUTPATIENT)
Dept: URGENT CARE | Facility: CLINIC | Age: 51
End: 2021-10-08
Payer: COMMERCIAL

## 2021-10-08 VITALS
WEIGHT: 158 LBS | HEART RATE: 69 BPM | BODY MASS INDEX: 26.33 KG/M2 | TEMPERATURE: 98 F | SYSTOLIC BLOOD PRESSURE: 154 MMHG | RESPIRATION RATE: 14 BRPM | HEIGHT: 65 IN | OXYGEN SATURATION: 99 % | DIASTOLIC BLOOD PRESSURE: 81 MMHG

## 2021-10-08 DIAGNOSIS — Z20.822 COVID-19 VIRUS NOT DETECTED: Primary | ICD-10-CM

## 2021-10-08 DIAGNOSIS — Z11.52 ENCOUNTER FOR SCREENING FOR COVID-19: ICD-10-CM

## 2021-10-08 LAB
CTP QC/QA: YES
SARS-COV-2 RDRP RESP QL NAA+PROBE: NEGATIVE

## 2021-10-08 PROCEDURE — U0002: ICD-10-PCS | Mod: QW,S$GLB,, | Performed by: NURSE PRACTITIONER

## 2021-10-08 PROCEDURE — U0002 COVID-19 LAB TEST NON-CDC: HCPCS | Mod: QW,S$GLB,, | Performed by: NURSE PRACTITIONER

## 2021-10-08 PROCEDURE — 99213 PR OFFICE/OUTPT VISIT, EST, LEVL III, 20-29 MIN: ICD-10-PCS | Mod: S$GLB,,, | Performed by: NURSE PRACTITIONER

## 2021-10-08 PROCEDURE — 99213 OFFICE O/P EST LOW 20 MIN: CPT | Mod: S$GLB,,, | Performed by: NURSE PRACTITIONER

## 2021-11-18 ENCOUNTER — OFFICE VISIT (OUTPATIENT)
Dept: URGENT CARE | Facility: CLINIC | Age: 51
End: 2021-11-18
Payer: COMMERCIAL

## 2021-11-18 VITALS
HEIGHT: 65 IN | HEART RATE: 67 BPM | SYSTOLIC BLOOD PRESSURE: 116 MMHG | TEMPERATURE: 98 F | WEIGHT: 149.81 LBS | OXYGEN SATURATION: 98 % | RESPIRATION RATE: 16 BRPM | DIASTOLIC BLOOD PRESSURE: 71 MMHG | BODY MASS INDEX: 24.96 KG/M2

## 2021-11-18 DIAGNOSIS — J32.9 SINUSITIS, UNSPECIFIED CHRONICITY, UNSPECIFIED LOCATION: Primary | ICD-10-CM

## 2021-11-18 DIAGNOSIS — H65.192 ACUTE EFFUSION OF LEFT EAR: ICD-10-CM

## 2021-11-18 LAB
CTP QC/QA: YES
CTP QC/QA: YES
FLUAV AG NPH QL: NEGATIVE
FLUBV AG NPH QL: NEGATIVE
SARS-COV-2 RDRP RESP QL NAA+PROBE: NEGATIVE

## 2021-11-18 PROCEDURE — 99214 OFFICE O/P EST MOD 30 MIN: CPT | Mod: 25,S$GLB,, | Performed by: NURSE PRACTITIONER

## 2021-11-18 PROCEDURE — 96372 PR INJECTION,THERAP/PROPH/DIAG2ST, IM OR SUBCUT: ICD-10-PCS | Mod: S$GLB,,, | Performed by: NURSE PRACTITIONER

## 2021-11-18 PROCEDURE — U0002: ICD-10-PCS | Mod: QW,S$GLB,, | Performed by: NURSE PRACTITIONER

## 2021-11-18 PROCEDURE — 96372 THER/PROPH/DIAG INJ SC/IM: CPT | Mod: S$GLB,,, | Performed by: NURSE PRACTITIONER

## 2021-11-18 PROCEDURE — U0002 COVID-19 LAB TEST NON-CDC: HCPCS | Mod: QW,S$GLB,, | Performed by: NURSE PRACTITIONER

## 2021-11-18 PROCEDURE — 87804 INFLUENZA ASSAY W/OPTIC: CPT | Mod: QW,,, | Performed by: NURSE PRACTITIONER

## 2021-11-18 PROCEDURE — 87804 POCT INFLUENZA A/B: ICD-10-PCS | Mod: 59,QW,, | Performed by: NURSE PRACTITIONER

## 2021-11-18 PROCEDURE — 99214 PR OFFICE/OUTPT VISIT, EST, LEVL IV, 30-39 MIN: ICD-10-PCS | Mod: 25,S$GLB,, | Performed by: NURSE PRACTITIONER

## 2021-11-18 RX ORDER — ONDANSETRON 4 MG/1
4 TABLET, ORALLY DISINTEGRATING ORAL EVERY 8 HOURS PRN
Qty: 15 TABLET | Refills: 0 | Status: SHIPPED | OUTPATIENT
Start: 2021-11-18

## 2021-11-18 RX ORDER — DEXAMETHASONE SODIUM PHOSPHATE 4 MG/ML
8 INJECTION, SOLUTION INTRA-ARTICULAR; INTRALESIONAL; INTRAMUSCULAR; INTRAVENOUS; SOFT TISSUE
Status: COMPLETED | OUTPATIENT
Start: 2021-11-18 | End: 2021-11-18

## 2021-11-18 RX ADMIN — DEXAMETHASONE SODIUM PHOSPHATE 8 MG: 4 INJECTION, SOLUTION INTRA-ARTICULAR; INTRALESIONAL; INTRAMUSCULAR; INTRAVENOUS; SOFT TISSUE at 09:11

## 2022-01-13 ENCOUNTER — OFFICE VISIT (OUTPATIENT)
Dept: URGENT CARE | Facility: CLINIC | Age: 52
End: 2022-01-13
Payer: COMMERCIAL

## 2022-01-13 VITALS
DIASTOLIC BLOOD PRESSURE: 84 MMHG | HEART RATE: 64 BPM | TEMPERATURE: 98 F | WEIGHT: 142.63 LBS | HEIGHT: 65 IN | RESPIRATION RATE: 18 BRPM | BODY MASS INDEX: 23.76 KG/M2 | OXYGEN SATURATION: 99 % | SYSTOLIC BLOOD PRESSURE: 149 MMHG

## 2022-01-13 DIAGNOSIS — U07.1 COVID-19 VIRUS DETECTED: ICD-10-CM

## 2022-01-13 DIAGNOSIS — R09.81 SINUS CONGESTION: ICD-10-CM

## 2022-01-13 DIAGNOSIS — J02.9 SORE THROAT: Primary | ICD-10-CM

## 2022-01-13 DIAGNOSIS — H92.09 OTALGIA, UNSPECIFIED LATERALITY: ICD-10-CM

## 2022-01-13 LAB
CTP QC/QA: YES
SARS-COV-2 AG RESP QL IA.RAPID: POSITIVE

## 2022-01-13 PROCEDURE — 87811 SARS-COV-2 COVID19 W/OPTIC: CPT | Mod: S$GLB,,, | Performed by: PHYSICIAN ASSISTANT

## 2022-01-13 PROCEDURE — 87811 SARS CORONAVIRUS 2 ANTIGEN POCT, MANUAL READ: ICD-10-PCS | Mod: S$GLB,,, | Performed by: PHYSICIAN ASSISTANT

## 2022-01-13 PROCEDURE — 99214 PR OFFICE/OUTPT VISIT, EST, LEVL IV, 30-39 MIN: ICD-10-PCS | Mod: S$GLB,,, | Performed by: PHYSICIAN ASSISTANT

## 2022-01-13 PROCEDURE — 99214 OFFICE O/P EST MOD 30 MIN: CPT | Mod: S$GLB,,, | Performed by: PHYSICIAN ASSISTANT

## 2022-01-13 NOTE — PATIENT INSTRUCTIONS
Patient Education       COVID-19 Discharge Instructions   About this topic   Coronavirus disease 2019 is also known as COVID-19. It is a viral illness that infects the lungs. It is caused by a virus called SARS-associated coronavirus (SARS-CoV-2).  The signs of COVID-19 most often start a few days after you have been infected. In some people, it takes longer to show signs. Others never show signs of the infection. You may have a cough, fever, shaking chills and it may be hard to breathe. You may be very tired, have muscle aches, a headache or sore throat. Some people have an upset stomach or loose stools. Others lose their sense of smell or taste. You may not have these signs all the time and they may come and go while you are sick.  The virus spreads easily through droplets when you talk, sneeze, or cough. You can pass the virus to others when you are talking close together, singing, hugging, sharing food, or shaking hands. Doctors believe the germs also survive on surfaces like tables, door handles, and telephones. However, this is not a common way that COVID-19 spreads. Doctors believe you can also spread the infection even if you dont have any symptoms, but they do not know how that happens. This is why getting vaccinated is one of the best ways to keep you healthy and slow the spread of the virus.  Some people have a mild case of COVID-19 and are able to stay at home and away from others until they feel better. Others may need to be in the hospital if they are very sick. Some people with COVID-19 can have some symptoms for weeks or months. People with COVID-19 must isolate themselves. You can start to be around others when your doctor says it is safe to do so.       What care is needed at home?   · Ask your doctor what you need to do when you go home. Make sure you ask questions if you do not understand what the doctor says.  · Drink lots of water, juice, or broth to replace fluids lost from a fever.  · You  may use cool mist humidifiers to help ease congestion and coughing.  · Use 2 to 3 pillows to prop yourself up when you lie down to make it easier to breathe and sleep.  · Do not smoke and do not drink beer, wine, and mixed drinks (alcohol).  · To lower the chance of passing the infection to others, get a COVID-19 vaccine after your infection has resolved.  · If you have not been fully vaccinated:  ? Wear a mask over your mouth and nose if you are around others who are not sick. Cloth masks work best if they have more than one layer of fabric.  ? Wash your hands often.  ? Stay home in a separate room, if possible, away from others. Only go out to get medical care.  ? Use a separate bathroom if possible.  ? Do not make food for others.  What follow-up care is needed?   · Your doctor may ask you to make visits to the office to check on your progress. Be sure to keep these visits. Make sure you wear a mask at these visits.  · If you can, tell the staff you have COVID-19 ahead of time so they can take extra care to stop the disease from spreading.  · It may take a few weeks before your health returns to normal.  What drugs may be needed?   The doctor may order drugs to:  · Help with breathing  · Help with fever  · Help with swelling in your airways and lungs  · Control coughing  · Ease a sore throat  · Help a runny or stuffy nose  Will physical activity be limited?   You may have to limit your physical activity. Talk to your doctor about the right amount of activity for you. If you have been very sick with COVID-19, it can take some time to get your strength back.  Will there be any other care needed?   Doctors do not know how long you can pass the virus on to others after you are sick. This is why it is important to stay in a separate room, if possible, when you are sick. For now, doctors are giving general guidelines for you to follow after you have been sick. Before you go around other people, you should:  · Be fever  free for 24 hours without taking any drugs to lower the fever  · Have no symptoms of cough or shortness of breath  · Wait at least 10 days after first having symptoms or your first positive test, and you need to be symptom free as above. Some experts suggest waiting 20 days if you have had a more severe infection.  Talk with your doctor about getting a COVID-19 vaccine.  What problems could happen?   · Fluid loss. This is dehydration.  · Short-term or long-term lung damage  · Heart problems  · Death  When do I need to call the doctor?   · You are having so much trouble breathing that you can only say one or two words at a time.  · You need to sit upright at all times to be able to breathe and/or cannot lie down.  · You are very confused or cannot stay awake.  · Your lips or skin start to turn blue or grey.  · You think you might be having a medical emergency. Some examples of medical emergencies are:  ? Severe chest pain.  ? Not able to speak or move normally.  · You have trouble breathing when talking or sitting still.  · You have new shortness of breath.  · You become weak or dizzy.  · You have very dark urine or do not pass urine for more than 8 hours.  · You have new or worsening COVID-19 symptoms like:  ? Fever  ? Cough  ? Feeling very tired  ? Shaking chills  ? Headache  ? Trouble swallowing  ? Throwing up  ? Loose stools  ? Reddish purple spots on your fingers or toes  Teach Back: Helping You Understand   The Teach Back Method helps you understand the information we are giving you. After you talk with the staff, tell them in your own words what you learned. This helps to make sure the staff has described each thing clearly. It also helps to explain things that may have been confusing. Before going home, make sure you can do these:  · I can tell you about my condition.  · I can tell you what may help ease my breathing.  · I can tell you what I can do to help avoid passing the infection to others.  · I can tell  you what I will do if I have trouble breathing; feel sleepy or confused; or my fingertips, fingernails, skin, or lips are blue.  Where can I learn more?   Centers for Disease Control and Prevention  https://www.cdc.gov/coronavirus/2019-ncov/about/index.html   Centers for Disease Control and Prevention  https://www.cdc.gov/coronavirus/2019-ncov/hcp/disposition-in-home-patients.html   World Health Organization  https://www.who.int/news-room/q-a-detail/k-g-orxmzpqahioby   Last Reviewed Date   2021-10-05  Consumer Information Use and Disclaimer   This information is not specific medical advice and does not replace information you receive from your health care provider. This is only a brief summary of general information. It does NOT include all information about conditions, illnesses, injuries, tests, procedures, treatments, therapies, discharge instructions or life-style choices that may apply to you. You must talk with your health care provider for complete information about your health and treatment options. This information should not be used to decide whether or not to accept your health care providers advice, instructions or recommendations. Only your health care provider has the knowledge and training to provide advice that is right for you.  Copyright   Copyright © 2021 [employer] and its affiliates and/or licensors. All rights reserved.

## 2022-01-13 NOTE — PROGRESS NOTES
"Subjective:       Patient ID: Cely Russell is a 51 y.o. female.    Vitals:  height is 5' 5" (1.651 m) and weight is 64.7 kg (142 lb 9.6 oz). Her temperature is 97.9 °F (36.6 °C). Her blood pressure is 149/84 (abnormal) and her pulse is 64. Her respiration is 18 and oxygen saturation is 99%.     Chief Complaint: Otalgia    Cely Russell is a 51 y.o. female presenting for evaluation of headache, sore throat and bilateral ear pain.  She states her symptoms have been occurring intermittently over the last couple of weeks, but began again recently.  No fever.  No difficulty breathing or shortness of breath.     Otalgia   There is pain in both (Left ear worse) ears. This is a new problem. The current episode started 1 to 4 weeks ago. Associated symptoms include headaches and a sore throat. Pertinent negatives include no abdominal pain, coughing, diarrhea, neck pain or rash. Treatments tried: Sudafed, Mucinex. The treatment provided no relief.       Constitution: Negative for chills and fever.   HENT: Positive for ear pain and sore throat. Negative for congestion.    Neck: Negative for neck pain.   Cardiovascular: Negative for chest pain and palpitations.   Respiratory: Negative for cough, shortness of breath and asthma.    Gastrointestinal: Negative for abdominal pain, nausea, constipation and diarrhea.   Genitourinary: Negative for dysuria and hematuria.   Musculoskeletal: Negative for pain.   Skin: Negative for rash.   Allergic/Immunologic: Negative for asthma.   Neurological: Positive for headaches. Negative for dizziness, light-headedness, numbness and tingling.   Psychiatric/Behavioral: Negative for nervous/anxious. The patient is not nervous/anxious.        Objective:      Physical Exam   Constitutional: She is oriented to person, place, and time. She does not appear ill. No distress.   HENT:   Head: Normocephalic and atraumatic.   Ears:      Comments: Serous effusion to bilateral TMs without " erythema, purulence or bulging.    Nose: Nose normal.   Mouth/Throat: Mucous membranes are moist. Posterior oropharyngeal erythema present.      Comments: Mild erythema noted to posterior oropharynx without edema or exudate.     Eyes: Conjunctivae are normal.   Neck: Neck supple.   Cardiovascular: Normal rate, regular rhythm, normal heart sounds and normal pulses.   Pulmonary/Chest: Effort normal and breath sounds normal. No respiratory distress. She has no wheezes. She has no rales.    Comments: Equal, bilateral breath sounds noted without wheezing.     Abdominal: Normal appearance. She exhibits no distension and no mass. Soft. There is no abdominal tenderness.   Musculoskeletal: Normal range of motion.         General: Normal range of motion.   Neurological: no focal deficit. She is alert and oriented to person, place, and time. No sensory deficit.   Skin: Skin is warm, dry and no rash.   Psychiatric: Mood normal.   Nursing note and vitals reviewed.        Assessment:       1. Sore throat    2. Sinus congestion    3. Otalgia, unspecified laterality    4. COVID-19 virus detected          Plan:         Sore throat  -     SARS Coronavirus 2 Antigen, POCT Manual Read    Sinus congestion  -     SARS Coronavirus 2 Antigen, POCT Manual Read    Otalgia, unspecified laterality  -     SARS Coronavirus 2 Antigen, POCT Manual Read    COVID-19 virus detected       COVID-19 antigen test is positive.  Pt is well appearing without hypoxia or respiratory distress.  No need for further imaging or testing at this time. Low suspicion for acute bacterial infection such as otitis media or bacterial sinusitis and no need for antibiotics at this time.  Pt will be discharged home to follow-up CDC guidelines regarding return to work/school guidelines.

## 2023-02-16 ENCOUNTER — OFFICE VISIT (OUTPATIENT)
Dept: URGENT CARE | Facility: CLINIC | Age: 53
End: 2023-02-16
Payer: COMMERCIAL

## 2023-02-16 VITALS
TEMPERATURE: 97 F | SYSTOLIC BLOOD PRESSURE: 153 MMHG | HEIGHT: 64 IN | RESPIRATION RATE: 18 BRPM | HEART RATE: 71 BPM | BODY MASS INDEX: 24.41 KG/M2 | WEIGHT: 143 LBS | OXYGEN SATURATION: 97 % | DIASTOLIC BLOOD PRESSURE: 88 MMHG

## 2023-02-16 DIAGNOSIS — R09.82 POST-NASAL DRIP: ICD-10-CM

## 2023-02-16 DIAGNOSIS — U07.1 COVID-19: Primary | ICD-10-CM

## 2023-02-16 DIAGNOSIS — R05.9 COUGH, UNSPECIFIED TYPE: ICD-10-CM

## 2023-02-16 LAB
CTP QC/QA: YES
CTP QC/QA: YES
FLUAV AG NPH QL: NEGATIVE
FLUBV AG NPH QL: NEGATIVE
SARS-COV-2 AG RESP QL IA.RAPID: POSITIVE

## 2023-02-16 PROCEDURE — 3079F PR MOST RECENT DIASTOLIC BLOOD PRESSURE 80-89 MM HG: ICD-10-PCS | Mod: CPTII,S$GLB,, | Performed by: NURSE PRACTITIONER

## 2023-02-16 PROCEDURE — 1159F PR MEDICATION LIST DOCUMENTED IN MEDICAL RECORD: ICD-10-PCS | Mod: CPTII,S$GLB,, | Performed by: NURSE PRACTITIONER

## 2023-02-16 PROCEDURE — 1159F MED LIST DOCD IN RCRD: CPT | Mod: CPTII,S$GLB,, | Performed by: NURSE PRACTITIONER

## 2023-02-16 PROCEDURE — 87811 SARS CORONAVIRUS 2 ANTIGEN POCT, MANUAL READ: ICD-10-PCS | Mod: QW,S$GLB,, | Performed by: NURSE PRACTITIONER

## 2023-02-16 PROCEDURE — 3008F PR BODY MASS INDEX (BMI) DOCUMENTED: ICD-10-PCS | Mod: CPTII,S$GLB,, | Performed by: NURSE PRACTITIONER

## 2023-02-16 PROCEDURE — 87811 SARS-COV-2 COVID19 W/OPTIC: CPT | Mod: QW,S$GLB,, | Performed by: NURSE PRACTITIONER

## 2023-02-16 PROCEDURE — 3077F SYST BP >= 140 MM HG: CPT | Mod: CPTII,S$GLB,, | Performed by: NURSE PRACTITIONER

## 2023-02-16 PROCEDURE — 3077F PR MOST RECENT SYSTOLIC BLOOD PRESSURE >= 140 MM HG: ICD-10-PCS | Mod: CPTII,S$GLB,, | Performed by: NURSE PRACTITIONER

## 2023-02-16 PROCEDURE — 87804 INFLUENZA ASSAY W/OPTIC: CPT | Mod: 59,QW,, | Performed by: NURSE PRACTITIONER

## 2023-02-16 PROCEDURE — 99214 OFFICE O/P EST MOD 30 MIN: CPT | Mod: S$GLB,,, | Performed by: NURSE PRACTITIONER

## 2023-02-16 PROCEDURE — 99214 PR OFFICE/OUTPT VISIT, EST, LEVL IV, 30-39 MIN: ICD-10-PCS | Mod: S$GLB,,, | Performed by: NURSE PRACTITIONER

## 2023-02-16 PROCEDURE — 3008F BODY MASS INDEX DOCD: CPT | Mod: CPTII,S$GLB,, | Performed by: NURSE PRACTITIONER

## 2023-02-16 PROCEDURE — 87804 POCT INFLUENZA A/B: ICD-10-PCS | Mod: 59,QW,, | Performed by: NURSE PRACTITIONER

## 2023-02-16 PROCEDURE — 3079F DIAST BP 80-89 MM HG: CPT | Mod: CPTII,S$GLB,, | Performed by: NURSE PRACTITIONER

## 2023-02-16 RX ORDER — PROMETHAZINE HYDROCHLORIDE AND DEXTROMETHORPHAN HYDROBROMIDE 6.25; 15 MG/5ML; MG/5ML
5 SYRUP ORAL EVERY 8 HOURS PRN
Qty: 150 ML | Refills: 0 | Status: SHIPPED | OUTPATIENT
Start: 2023-02-16 | End: 2023-02-26

## 2023-02-16 RX ORDER — FLUTICASONE PROPIONATE 50 MCG
1 SPRAY, SUSPENSION (ML) NASAL DAILY
Qty: 9.9 ML | Refills: 0 | Status: SHIPPED | OUTPATIENT
Start: 2023-02-16

## 2023-02-16 NOTE — PROGRESS NOTES
"Subjective:       Patient ID: Cely Russell is a 52 y.o. female.    Vitals:  height is 5' 4" (1.626 m) and weight is 64.9 kg (143 lb). Her oral temperature is 97.2 °F (36.2 °C). Her blood pressure is 153/88 (abnormal) and her pulse is 71. Her respiration is 18 and oxygen saturation is 97%.     Chief Complaint: Cough    Ms. Russell is a 52 year old female with a history of seasonal allergies who presents today with complaints of sinus congestion. It is mild. It is associated with cough, ear fullness, headache, and fatigue. She denies chest pain, SOB, N/V/D, dizziness, or LOC. Symptoms began on Sunday and OTC treatments have not been effective. She has no known sick contacts.     Cough  This is a new problem. The current episode started in the past 7 days. Associated symptoms include chills, headaches and nasal congestion. Pertinent negatives include no chest pain, fever, sore throat or shortness of breath. Associated symptoms comments: Sinus problem, nausea. Treatments tried: sudafed, theraflu.     Constitution: Positive for chills. Negative for appetite change and fever.   HENT:  Positive for congestion. Negative for sore throat.    Neck: Negative for neck stiffness.   Cardiovascular:  Negative for chest pain.   Eyes:  Negative for blurred vision.   Respiratory:  Positive for cough. Negative for shortness of breath.    Gastrointestinal:  Negative for nausea, vomiting and diarrhea.   Genitourinary:  Negative for dysuria.   Musculoskeletal:  Negative for back pain.   Skin:  Negative for hives.   Allergic/Immunologic: Negative for hives.   Neurological:  Positive for headaches. Negative for dizziness and light-headedness.     Objective:      Physical Exam   Constitutional: She is oriented to person, place, and time. She appears well-developed. She is cooperative.  Non-toxic appearance. She does not appear ill. No distress.   HENT:   Head: Normocephalic and atraumatic.   Ears:   Right Ear: Hearing and external " ear normal.   Left Ear: Hearing and external ear normal.   Nose: Congestion present. No mucosal edema or nasal deformity. No epistaxis. Right sinus exhibits no maxillary sinus tenderness and no frontal sinus tenderness. Left sinus exhibits no maxillary sinus tenderness and no frontal sinus tenderness.      Comments: Nasal turbinates boggy and swollen   Mouth/Throat: Uvula is midline and mucous membranes are normal. No trismus in the jaw. Normal dentition. No uvula swelling. Posterior oropharyngeal erythema present.      Comments: Mild posterior oropharyngeal erythema without exudate  Eyes: Conjunctivae and lids are normal. Right eye exhibits no discharge. Left eye exhibits no discharge. No scleral icterus.   Neck: Trachea normal and phonation normal. Neck supple.   Cardiovascular: Normal rate.   Pulmonary/Chest: Effort normal. No respiratory distress.   Abdominal: Normal appearance. She exhibits no distension and no pulsatile midline mass.   Musculoskeletal: Normal range of motion.         General: No deformity. Normal range of motion.   Neurological: She is alert and oriented to person, place, and time. She exhibits normal muscle tone. Coordination normal.   Skin: Skin is warm, dry, intact, not diaphoretic and not pale.   Psychiatric: Her speech is normal and behavior is normal. Judgment and thought content normal.   Nursing note and vitals reviewed.      Assessment:       1. COVID-19    2. Cough, unspecified type    3. Post-nasal drip          Plan:         COVID-19  -     nirmatrelvir-ritonavir 300 mg (150 mg x 2)-100 mg copackaged tablets (EUA); Take 3 tablets by mouth 2 (two) times daily for 5 days. Each dose contains 2 nirmatrelvir (pink tablets) and 1 ritonavir (white tablet). Take all 3 tablets together  Dispense: 30 tablet; Refill: 0    Cough, unspecified type  -     POCT Influenza A/B Rapid Antigen  -     SARS Coronavirus 2 Antigen, POCT Manual Read  -     promethazine-dextromethorphan (PROMETHAZINE-DM)  6.25-15 mg/5 mL Syrp; Take 5 mLs by mouth every 8 (eight) hours as needed (cough).  Dispense: 150 mL; Refill: 0    Post-nasal drip  -     fluticasone propionate (FLONASE) 50 mcg/actuation nasal spray; 1 spray (50 mcg total) by Each Nostril route once daily.  Dispense: 9.9 mL; Refill: 0       Rapid flu is negative. Rapid covid is positive. Strict ED precautions given to pt who verbalizes understanding. Isolation precautions given per current CDC guidelines.